# Patient Record
Sex: FEMALE | Race: NATIVE HAWAIIAN OR OTHER PACIFIC ISLANDER | HISPANIC OR LATINO | URBAN - METROPOLITAN AREA
[De-identification: names, ages, dates, MRNs, and addresses within clinical notes are randomized per-mention and may not be internally consistent; named-entity substitution may affect disease eponyms.]

---
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
## 2023-04-28 PROBLEM — Z00.00 ENCOUNTER FOR PREVENTIVE HEALTH EXAMINATION: Status: ACTIVE | Noted: 2023-04-28

## 2023-05-01 ENCOUNTER — APPOINTMENT (OUTPATIENT)
Dept: RHEUMATOLOGY | Facility: CLINIC | Age: 18
End: 2023-05-01
Payer: MEDICAID

## 2023-05-01 VITALS
HEART RATE: 57 BPM | SYSTOLIC BLOOD PRESSURE: 115 MMHG | HEIGHT: 60 IN | DIASTOLIC BLOOD PRESSURE: 60 MMHG | OXYGEN SATURATION: 99 % | TEMPERATURE: 97.8 F | BODY MASS INDEX: 24.54 KG/M2 | WEIGHT: 125 LBS

## 2023-05-01 DIAGNOSIS — M25.50 PAIN IN UNSPECIFIED JOINT: ICD-10-CM

## 2023-05-01 DIAGNOSIS — M13.0 POLYARTHRITIS, UNSPECIFIED: ICD-10-CM

## 2023-05-01 PROCEDURE — 99204 OFFICE O/P NEW MOD 45 MIN: CPT

## 2023-05-01 RX ORDER — SERTRALINE 25 MG/1
25 TABLET, FILM COATED ORAL
Qty: 30 | Refills: 0 | Status: ACTIVE | COMMUNITY
Start: 2023-03-27

## 2023-05-01 RX ORDER — CETIRIZINE HYDROCHLORIDE 10 MG/1
10 TABLET, COATED ORAL
Qty: 30 | Refills: 0 | Status: ACTIVE | COMMUNITY
Start: 2022-05-26

## 2023-05-01 RX ORDER — TOPIRAMATE 100 MG/1
100 TABLET, FILM COATED ORAL
Qty: 60 | Refills: 0 | Status: ACTIVE | COMMUNITY
Start: 2022-11-22

## 2023-05-01 RX ORDER — NAPROXEN 500 MG/1
500 TABLET ORAL
Qty: 60 | Refills: 0 | Status: ACTIVE | COMMUNITY
Start: 2023-02-10

## 2023-05-01 NOTE — REVIEW OF SYSTEMS
[As Noted in HPI] : as noted in HPI [Negative] : Heme/Lymph NST done in [facility] , tracing not archive  NST reactive. FHR baseline 140 with accels. Irritability noted on tracing. Pt not feeling pain.

## 2023-05-02 RX ORDER — MELOXICAM 15 MG/1
15 TABLET ORAL DAILY
Qty: 14 | Refills: 0 | Status: ACTIVE | COMMUNITY
Start: 2023-05-02 | End: 1900-01-01

## 2023-05-02 NOTE — REASON FOR VISIT
[Follow-Up: _____] : a [unfilled] follow-up visit [[employer] ] : provided by [employer]   [Parent] : parent [Family Member] : family member [Interpreters_IDNumber] : [id number] [Interpreters_FullName] : Candice [TWNoteComboBox1] : Citizen of [place]

## 2023-05-02 NOTE — HISTORY OF PRESENT ILLNESS
[FreeTextEntry1] : 17 year old female with PMH as listed below presents today for an initial evaluation\par \par Reports to have 1 year hx of joint pain that is getting progressively worse\par Symptoms worse to BL hands, BL knees, BL feet. \par Associated with morning stiffness 30 mins+ intermittent swelling to the joints\par \par Her pain starts in AM and lasts the whole day. Worse with activity and at the end of the day. \par Currently taking NSAIDS- ibuprofen alternating with Tylenol prn for pain with some improvement in symptoms. \par \par denies fever, chills, oral/ nasal ulcers, denies chest pain, chest palpitations, denies sob, denies nausea, vomiting, abdominal pain, diarrhea, constipation, blood in stool, denies dysuria, blood in the urine, denies rashes, photosensitivity, denies blood clots,  raynauds\par \par FH: denies FH of autoimmune disease\par \par NO recent travel, sick contacts. NO recent illness. \par \par Recently evaluated by her PCP\par Labs/chart reviewed: MILA

## 2023-05-02 NOTE — ASSESSMENT
[FreeTextEntry1] : 1 year hx of polyarthralgias- worse to BL hands, BL knees, BL feet\par \par - labs as below. will call pt with results\par - NSAIDS/Tylenol prn for pain. start meloxicam 15mg daily prn for pain. \par - OTC topical analgesics\par - encouraged exercise and weight loss\par \par Discussed treatment plan with the patient. The patient was given the opportunity to ask questions and all questions were answered to their satisfaction.

## 2023-05-31 ENCOUNTER — APPOINTMENT (OUTPATIENT)
Dept: RHEUMATOLOGY | Facility: CLINIC | Age: 18
End: 2023-05-31

## 2023-06-26 ENCOUNTER — OFFICE (OUTPATIENT)
Dept: URBAN - METROPOLITAN AREA CLINIC 111 | Facility: CLINIC | Age: 18
Setting detail: OPHTHALMOLOGY
End: 2023-06-26
Payer: COMMERCIAL

## 2023-06-26 DIAGNOSIS — D33.2: ICD-10-CM

## 2023-06-26 DIAGNOSIS — H50.42: ICD-10-CM

## 2023-06-26 DIAGNOSIS — H47.333: ICD-10-CM

## 2023-06-26 PROBLEM — H52.03 HYPEROPIA; BOTH EYES: Status: ACTIVE | Noted: 2023-06-26

## 2023-06-26 PROCEDURE — 92012 INTRM OPH EXAM EST PATIENT: CPT | Performed by: OPHTHALMOLOGY

## 2023-06-26 ASSESSMENT — REFRACTION_AUTOREFRACTION
OS_CYLINDER: -0.25
OD_SPHERE: -0.25
OS_AXIS: 117
OD_AXIS: 077
OD_CYLINDER: -0.25
OS_SPHERE: -0.25

## 2023-06-26 ASSESSMENT — AXIALLENGTH_DERIVED
OS_AL: 24.0038
OS_AL: 24.3102
OD_AL: 24.4573
OD_AL: 24.3013

## 2023-06-26 ASSESSMENT — REFRACTION_MANIFEST
OD_CYLINDER: -0.50
OS_VA1: 20/25-2
OD_SPHERE: +0.25
OS_CYLINDER: -0.25
OS_SPHERE: +0.50
OD_AXIS: 085
OS_AXIS: 120
OD_VA1: 20/30

## 2023-06-26 ASSESSMENT — VISUAL ACUITY
OD_BCVA: 20/30
OS_BCVA: 20/40-1,30-2

## 2023-06-26 ASSESSMENT — SPHEQUIV_DERIVED
OD_SPHEQUIV: 0
OS_SPHEQUIV: -0.375
OD_SPHEQUIV: -0.375
OS_SPHEQUIV: 0.375

## 2023-06-26 ASSESSMENT — KERATOMETRY
OS_AXISANGLE_DEGREES: 94
OD_K1POWER_DIOPTERS: 41.50
OD_AXISANGLE_DEGREES: 93
OD_K2POWER_DIOPTERS: 41.75
OS_K1POWER_DIOPTERS: 41.75
OS_K2POWER_DIOPTERS: 42.25

## 2023-06-26 ASSESSMENT — CONFRONTATIONAL VISUAL FIELD TEST (CVF)
OS_FINDINGS: FULL
OD_FINDINGS: FULL

## 2023-08-15 LAB
ALBUMIN MFR SERPL ELPH: 57.2 %
ALBUMIN SERPL ELPH-MCNC: 4.3 G/DL
ALBUMIN SERPL-MCNC: 4.1 G/DL
ALBUMIN/GLOB SERPL: 1.3 RATIO
ALP BLD-CCNC: 89 U/L
ALPHA1 GLOB MFR SERPL ELPH: 4 %
ALPHA1 GLOB SERPL ELPH-MCNC: 0.3 G/DL
ALPHA2 GLOB MFR SERPL ELPH: 9.3 %
ALPHA2 GLOB SERPL ELPH-MCNC: 0.7 G/DL
ALT SERPL-CCNC: 15 U/L
ANA SER IF-ACNC: NEGATIVE
ANION GAP SERPL CALC-SCNC: 11 MMOL/L
AST SERPL-CCNC: 11 U/L
B-GLOBULIN MFR SERPL ELPH: 11.2 %
B-GLOBULIN SERPL ELPH-MCNC: 0.8 G/DL
BASOPHILS # BLD AUTO: 0.04 K/UL
BASOPHILS NFR BLD AUTO: 0.6 %
BILIRUB SERPL-MCNC: 0.4 MG/DL
BUN SERPL-MCNC: 11 MG/DL
C3 SERPL-MCNC: 132 MG/DL
C4 SERPL-MCNC: 23 MG/DL
CALCIUM SERPL-MCNC: 9.4 MG/DL
CCP AB SER IA-ACNC: <8 UNITS
CENTROMERE IGG SER-ACNC: <0.2 AL
CHLORIDE SERPL-SCNC: 108 MMOL/L
CHROMATIN AB SERPL-ACNC: <0.2 AL
CK SERPL-CCNC: 95 U/L
CO2 SERPL-SCNC: 20 MMOL/L
CREAT SERPL-MCNC: 0.78 MG/DL
CREAT SPEC-SCNC: 40 MG/DL
CREAT/PROT UR: 0.5 RATIO
CRP SERPL-MCNC: <3 MG/L
DSDNA AB SER-ACNC: <12 IU/ML
EGFR: 112 ML/MIN/1.73M2
ENA RNP AB SER IA-ACNC: <0.2 AL
ENA RNP AB SER IA-ACNC: <0.2 AL
ENA SCL70 IGG SER IA-ACNC: <0.2 AL
ENA SM AB SER IA-ACNC: <0.2 AL
ENA SS-A AB SER IA-ACNC: <0.2 AL
ENA SS-B AB SER IA-ACNC: <0.2 AL
EOSINOPHIL # BLD AUTO: 0.03 K/UL
EOSINOPHIL NFR BLD AUTO: 0.4 %
ERYTHROCYTE [SEDIMENTATION RATE] IN BLOOD BY WESTERGREN METHOD: 17 MM/HR
GAMMA GLOB FLD ELPH-MCNC: 1.3 G/DL
GAMMA GLOB MFR SERPL ELPH: 18.3 %
GLUCOSE SERPL-MCNC: 91 MG/DL
HCT VFR BLD CALC: 43.6 %
HGB BLD-MCNC: 13.8 G/DL
HISTONE AB SER QL: 0.6 UNITS
IMM GRANULOCYTES NFR BLD AUTO: 0.1 %
INTERPRETATION SERPL IEP-IMP: NORMAL
LYMPHOCYTES # BLD AUTO: 2.44 K/UL
LYMPHOCYTES NFR BLD AUTO: 35.6 %
M PROTEIN SPEC IFE-MCNC: NORMAL
MAN DIFF?: NORMAL
MCHC RBC-ENTMCNC: 31.5 PG
MCHC RBC-ENTMCNC: 31.7 GM/DL
MCV RBC AUTO: 99.5 FL
MONOCYTES # BLD AUTO: 0.36 K/UL
MONOCYTES NFR BLD AUTO: 5.2 %
NEUTROPHILS # BLD AUTO: 3.98 K/UL
NEUTROPHILS NFR BLD AUTO: 58.1 %
PLATELET # BLD AUTO: 317 K/UL
POTASSIUM SERPL-SCNC: 3.9 MMOL/L
PROT SERPL-MCNC: 7 G/DL
PROT SERPL-MCNC: 7.2 G/DL
PROT SERPL-MCNC: 7.2 G/DL
PROT UR-MCNC: 22 MG/DL
RBC # BLD: 4.38 M/UL
RBC # FLD: 14.4 %
RF+CCP IGG SER-IMP: NEGATIVE
RHEUMATOID FACT SER QL: <10 IU/ML
RNA POLYMERASE III IGG: 12 UNITS
SODIUM SERPL-SCNC: 138 MMOL/L
THYROGLOB AB SERPL-ACNC: <20 IU/ML
THYROPEROXIDASE AB SERPL IA-ACNC: <10 IU/ML
TSH SERPL-ACNC: 1.18 UIU/ML
WBC # FLD AUTO: 6.86 K/UL

## 2024-08-28 NOTE — ED PROVIDER NOTE - PHYSICAL EXAMINATION
Const: Awake, alert and oriented. In no acute distress. Well appearing.  HEENT: NC/AT. Moist mucous membranes.  Eyes: No scleral icterus. EOMI.  Neck:. Soft and supple. Full ROM without pain.  Cardiac:. +S1/S2. No murmurs. Peripheral pulses 2+ and symmetric. No LE edema.  Resp: Speaking in full sentences. No evidence of respiratory distress. No wheezes, rales or rhonchi.  : No external lesions noted, uvula erythematous white discharge noted resemblance to fungal infection   Abd: Soft, non-tender, non-distended. Normal bowel sounds in all 4 quadrants. No guarding or rebound.  Back: Spine midline and non-tender. No CVAT.  Skin: No rashes, abrasions or lacerations.  Lymph: No cervical lymphadenopathy.  Neuro: Awake, alert & oriented x 3. Moves all extremities symmetrically.
[facility] vital signs on flow sheet

## 2025-01-08 ENCOUNTER — EMERGENCY (EMERGENCY)
Facility: HOSPITAL | Age: 20
LOS: 1 days | Discharge: HOME | End: 2025-01-08
Attending: EMERGENCY MEDICINE
Payer: COMMERCIAL

## 2025-01-08 VITALS
WEIGHT: 259.93 LBS | DIASTOLIC BLOOD PRESSURE: 53 MMHG | OXYGEN SATURATION: 98 % | HEIGHT: 66 IN | TEMPERATURE: 99 F | SYSTOLIC BLOOD PRESSURE: 100 MMHG | RESPIRATION RATE: 20 BRPM | HEART RATE: 78 BPM

## 2025-01-08 PROCEDURE — 99284 EMERGENCY DEPT VISIT MOD MDM: CPT | Mod: 25

## 2025-01-08 PROCEDURE — 73630 X-RAY EXAM OF FOOT: CPT

## 2025-01-08 PROCEDURE — 73610 X-RAY EXAM OF ANKLE: CPT | Mod: 26,RT

## 2025-01-08 PROCEDURE — 73630 X-RAY EXAM OF FOOT: CPT | Mod: 26,RT

## 2025-01-08 PROCEDURE — 29515 APPLICATION SHORT LEG SPLINT: CPT | Mod: RT

## 2025-01-08 PROCEDURE — 73610 X-RAY EXAM OF ANKLE: CPT

## 2025-01-08 PROCEDURE — T1013: CPT

## 2025-01-08 RX ORDER — IBUPROFEN 200 MG
600 TABLET ORAL ONCE
Refills: 0 | Status: COMPLETED | OUTPATIENT
Start: 2025-01-08 | End: 2025-01-08

## 2025-01-08 RX ADMIN — Medication 600 MILLIGRAM(S): at 10:42

## 2025-01-08 NOTE — ED PROVIDER NOTE - CONDITION AT DISCHARGE:
Cheer practice and collided with another girl  Hit nose against their head.    Right deviation noted to nose  No blood thinners  No LOC  
Improved

## 2025-01-08 NOTE — ED PROVIDER NOTE - PATIENT PORTAL LINK FT
You can access the FollowMyHealth Patient Portal offered by [facility] by registering at the following website: [email or web address]. By joining [employer]’s FollowMyHealth portal, you will also be able to view your health information using other applications (apps) compatible with our system.

## 2025-01-08 NOTE — ED PROVIDER NOTE - ATTENDING APP SHARED VISIT CONTRIBUTION OF CARE
18 y/o female with hx of anxiety presents to the ED c/o right ankle pain and foot pain. pt notes she rolled her ankle today and had been having ankle pain and foot pain since. Fell did not hit her head. no loc. Did not take any meds prior to arrival. Denies numbness, tingling, coldness. denies pregnancy. able to walk but with pain. Denies f/c/n/v/cp/sob/palpitations/ cough/rash/headache/dizziness/abd.pain/d/c/dysuria/hematuria      +SWELLING and ttp to lateral mal of right ankle neurovasc intact from to ankle    - MOST LIKELY a prain xray pain control crutches reassess

## 2025-01-08 NOTE — ED PROVIDER NOTE - CARE PROVIDER_API CALL
Carlos Mcintosh  [facility]  [address] [place] [address]  Phone: [phone]  Fax: [phone]  Follow Up Time:

## 2025-01-08 NOTE — ED PROVIDER NOTE - OBJECTIVE STATEMENT
20 y/o female with hx of anxiety presents to the ED c/o right ankle pain and foot pain. pt notes she rolled her ankle today and had been having ankle pain and foot pain since. Fell did not hit her head. no loc. Did not take any meds prior to arrival. Denies numbness, tingling, coldness. denies pregnancy.

## 2025-01-08 NOTE — ED PROVIDER NOTE - CLINICAL SUMMARY MEDICAL DECISION MAKING FREE TEXT BOX
20 y/o female with hx of anxiety presents to the ED c/o right ankle pain and foot pain. Tendder to r atfl and mid foot, 18 y/o female with hx of anxiety presents to the ED c/o right ankle pain and foot pain. Tendder to r atfl and mid foot, ? cortical disruption of right distal fibula, will apply posterior splint, f/uw ith ortho

## 2025-01-08 NOTE — ED PROVIDER NOTE - PHYSICAL EXAMINATION
General-alert and oriented to person place and time, nontoxic appearing, pleasant cooperative, NAD  HEENT-normocephalic, atraumatic, NT to palp, EOMI, PERRLA, no conjunctival injections,  Cardio-s1,s2 present, regular rate and rhythm  Resp- talks in full sentences, symmetrical chest rise, CTA bilat, no evidence of wheezes, rhonchi noted  Abdomen- bowel sounds presnt in all 4 quadrants, soft, NT/ND, no guarding, no rebound tenderness  MSK- moves all extremities, tender to r atfl and ptfl, minimal tenderness to r lateral malleolus, mild tenderness to midfoot  Pulses- 2+ Dp/PT  Back- nt to palp of cervical, thoracic, lumbar spine, nt to palp of paraspinal m., No CVA tenderness  Neuro- no focal deficits, sensation intact

## 2025-01-15 ENCOUNTER — APPOINTMENT (OUTPATIENT)
Age: 20
End: 2025-01-15
Payer: COMMERCIAL

## 2025-01-15 VITALS — BODY MASS INDEX: 49.09 KG/M2 | WEIGHT: 260 LBS | HEIGHT: 61 IN

## 2025-01-15 DIAGNOSIS — S93.401A SPRAIN OF UNSPECIFIED LIGAMENT OF RIGHT ANKLE, INITIAL ENCOUNTER: ICD-10-CM

## 2025-01-15 DIAGNOSIS — M79.671 PAIN IN RIGHT FOOT: ICD-10-CM

## 2025-01-15 PROCEDURE — 73610 X-RAY EXAM OF ANKLE: CPT | Mod: RT

## 2025-01-15 PROCEDURE — 99204 OFFICE O/P NEW MOD 45 MIN: CPT

## 2025-02-04 ENCOUNTER — APPOINTMENT (OUTPATIENT)
Age: 20
End: 2025-02-04